# Patient Record
Sex: MALE | Race: WHITE
[De-identification: names, ages, dates, MRNs, and addresses within clinical notes are randomized per-mention and may not be internally consistent; named-entity substitution may affect disease eponyms.]

---

## 2018-03-07 ENCOUNTER — HOSPITAL ENCOUNTER (EMERGENCY)
Dept: HOSPITAL 24 - ER | Age: 58
Discharge: HOME | End: 2018-03-07
Payer: SELF-PAY

## 2018-03-07 VITALS — BODY MASS INDEX: 21.2 KG/M2

## 2018-03-07 VITALS — SYSTOLIC BLOOD PRESSURE: 123 MMHG | DIASTOLIC BLOOD PRESSURE: 72 MMHG

## 2018-03-07 DIAGNOSIS — M62.838: Primary | ICD-10-CM

## 2018-03-07 PROCEDURE — 99282 EMERGENCY DEPT VISIT SF MDM: CPT

## 2018-03-07 PROCEDURE — 96372 THER/PROPH/DIAG INJ SC/IM: CPT

## 2018-03-07 PROCEDURE — 99281 EMR DPT VST MAYX REQ PHY/QHP: CPT

## 2018-03-07 NOTE — DR.EXTPAIN
HPI





- Time seen


Time seen: 20:06





- PCP


Primary Care Physician: None





- Complaint/Symptoms


Chief Complaint Doctor Comments: Patient states that he was at work at [employer] 

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


Chief Complaint:: Started having pain the middle of his back when he was at 

work.





- Source


History Provided: Patient





- Mode of arrival


Mode of Arrival: Ambulatory





- Timing


Onset of Chief Complaint: 03/07/18





PMH





- PMH


Past Medical History: No


Past Medical History: COPD


Past Surgical History: Yes


Surgical History: Ortho Surgery


Past Surgical History Comment: Neck surgery





- Family History


History of Family Medical Conditions: Yes


Family Medical History: Diabetes Mellitus, MI, Coronary Artery Disease, 

Hypertension





- Social History


Does patient currently use any type of tobacco product: Yes


Have you used tobacco products in the last 12 months: Yes


Type of Tobacco Use: Cigarettes


Does any household member use tobacco: Yes


Alcohol Use: None


Do you use any recreational Drugs:: No


Lives With: Spouse


Lives Where: Home





- infectious screening


In the last 2 months have you had wt loss of >10#?: YES


Have you had fever, night sweats or hemotysis?: No


Have you traveled outside the country in the last 6 months?: No


Isolation: Standard





ROS





- Review of Systems


Eyes: No Symptoms Reported


ENTM: No Symptoms Reported


Respiratoy: No Symptoms Reported


Cardiovascular: No Symptoms Reported


Gastrointestinal/Abdominal: No Symptoms Reported


Genitourinary: No Symptoms Reported


Neurological: No Symptoms Reported


Musculoskeletal: No Symptoms Reported


Integumentary: No Symptoms Reported


Hematologic/Lymphatic: No Symptoms Reported


Endocrine: No Symptoms Reported


Psychiatric: No Symptoms Reported


All Other Systems: Reviewed and Negative





PE





- Vital Signs


Vitals: 


 





Temperature                      98.9 F


Pulse Rate                       60


Respiratory Rate                 20


Blood Pressure [Right Arm]       144/70


Blood Pressure [Left Arm]        170/83


Blood Pressure                   123/72


O2 Sat by Pulse Oximetry         99











- General


General Appearance: Alert, In No Apparent Distress





- Head


Head Exam: Normal Inspection, Atraumatic





- Eyes


Eye exam: Normal Appearance, PERRL, EOMI





- ENT


ENT Exam: Normal Exam





- Neck


Neck Exam: Normal Inspection, Full ROM





- Chest


Chest Inspection: Normal Inspection





- Respiratory


Respiratory Exam: Normal Lung Sounds Bilat


Respiratory Exam: Bilateral Clear to Auscultation





- Cardiovascular


Cardiovascular Exam: Regular Rate





- Abdominal Exam


Abdominal Exam: Normal Inspection


Abdominal Tenderness: negative: RUQ, RLQ, LUQ, LLQ, Epigastrium, Suprapubic, 

Diffuse, Mild, Moderate, Severe, Other





- Extremities


Extremities Exam: Normal Inspection, Full ROM





- Upper Extremities


Shoulder Exam: Normal Inspection, Full ROM


Arm Exam: Normal Inspection


Elbow Exam: Normal Inspection


Forearm Exam: Normal Inspection


Hand Exam: Normal Inspection


Neuromotor Exam: Normal Exam


Neurosensory Exam: Normal Exam


Hand Tendon Exam: Flexor Digitorium Profundus (Location)


Upper Ext. Vascular Exam: Capillary Refill, Radial Pulse





- Lower Extremities


Hip/Pelvis Exam: Normal Inspection


Upper Leg Exam: Normal Inspection


Knee Exam: Normal Inspection


Lower Leg Exam: Normal Inspection


Ankle Exam: Normal Inspection


Foot/Toe Exam: Normal Inspection


Neurovascular/Tendon Exam: Normal Capillary Refill


Gait Exam: Observed and Normal





- Back


Back Exam: Normal Inspection, Other (compensatory scoliosis upper back with 

curvature to left)





- Neurological


Neurological Exam: Alert, Oriented X3, CN II-XII Intact





- Psychiatric


Psychiatric Exam: Normal Affect, Normal Mood





- Skin


Skin Exam: Warm, Dry


Type of Lesion: Rash





Course





- Education/Counseling


Educated On: Treatment, Diagnosis





- Diagnosis


Discharge Problem: 


 Muscle spasm








- Discharge Plan


Condition: Stable





- Follow ups/Referrals


Follow ups/Referrals: 


[facility],None [Primary Care Provider] - 3 days





- Instructions